# Patient Record
Sex: MALE | URBAN - METROPOLITAN AREA
[De-identification: names, ages, dates, MRNs, and addresses within clinical notes are randomized per-mention and may not be internally consistent; named-entity substitution may affect disease eponyms.]

---

## 2021-11-08 ENCOUNTER — OFFICE VISIT (OUTPATIENT)
Dept: URBAN - METROPOLITAN AREA CLINIC 40 | Facility: CLINIC | Age: 36
End: 2021-11-08
Payer: COMMERCIAL

## 2021-11-08 DIAGNOSIS — H10.811 PINGUECULITIS, RIGHT EYE: Primary | ICD-10-CM

## 2021-11-08 PROCEDURE — 99203 OFFICE O/P NEW LOW 30 MIN: CPT | Performed by: OPTOMETRIST

## 2021-11-08 RX ORDER — PREDNISOLONE ACETATE 10 MG/ML
1 % SUSPENSION/ DROPS OPHTHALMIC
Qty: 5 | Refills: 0 | Status: INACTIVE
Start: 2021-11-08 | End: 2021-12-05

## 2021-11-08 NOTE — IMPRESSION/PLAN
Impression: Pingueculitis, right eye: H10.811. Plan: Discussed diagnosis in detail with patient. Will continue to observe condition and or symptoms. New medication(s) Rx given today. Patient instructed to use artificial tears as needed. Patient instructed to apply cold compresses.